# Patient Record
Sex: MALE | NOT HISPANIC OR LATINO | URBAN - METROPOLITAN AREA
[De-identification: names, ages, dates, MRNs, and addresses within clinical notes are randomized per-mention and may not be internally consistent; named-entity substitution may affect disease eponyms.]

---

## 2021-11-08 ENCOUNTER — IMPORTED ENCOUNTER (OUTPATIENT)
Dept: URBAN - METROPOLITAN AREA CLINIC 31 | Facility: CLINIC | Age: 76
End: 2021-11-08

## 2021-11-08 PROBLEM — E11.9: Noted: 2021-11-08

## 2021-11-08 PROBLEM — E11.3291: Noted: 2021-11-08

## 2021-11-08 PROBLEM — Z96.1: Noted: 2021-11-08

## 2021-11-08 PROBLEM — H25.12: Noted: 2021-11-08

## 2021-11-08 PROBLEM — H26.491: Noted: 2021-11-08

## 2021-11-08 PROCEDURE — 92004 COMPRE OPH EXAM NEW PT 1/>: CPT

## 2021-11-08 PROCEDURE — 92015 DETERMINE REFRACTIVE STATE: CPT

## 2021-11-08 NOTE — PATIENT DISCUSSION
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 Stressed the importance of regular followup and good control of BS BP and Lipids to avoid future complications.

## 2021-11-08 NOTE — PATIENT DISCUSSION
1.  Nuclear Sclerotic Cataract OS: Eval with Dr. Serrano [place] in [facility] on a Monday. Told will need glasses after Sx.2.  DM II without sign of Diabetic Retinopathy OS:  Discussed the pathophysiology of diabetes and its effect on the eye. Stressed the importance of regular followup and good control of BS BP and Lipids to avoid future complications. 3.  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 Stressed the importance of regular followup and good control of BS BP and Lipids to avoid future complications. 4. PCO  OD (Posterior Capsule Opacification)  Not visually significant at this time. Monitor for yag capsulotomy necessity. 5. Pseudophakia OD - IOL stable. Monitor for changes in vision.

## 2021-12-06 ENCOUNTER — IMPORTED ENCOUNTER (OUTPATIENT)
Dept: URBAN - METROPOLITAN AREA CLINIC 31 | Facility: CLINIC | Age: 76
End: 2021-12-06

## 2021-12-06 PROBLEM — Z96.1: Noted: 2021-12-06

## 2021-12-06 PROBLEM — H25.812: Noted: 2021-12-06

## 2021-12-06 PROCEDURE — 92025 CPTRIZED CORNEAL TOPOGRAPHY: CPT

## 2021-12-06 PROCEDURE — 99213 OFFICE O/P EST LOW 20 MIN: CPT

## 2021-12-06 PROCEDURE — 92136 OPHTHALMIC BIOMETRY: CPT

## 2021-12-06 NOTE — PATIENT DISCUSSION
1. Combined Types of Cataract OS: Discussed the risks benefits alternatives and limitations of cataract surgery including infection bleeding loss of vision retinal tears detachment. Refractive options were reviewed. The patient stated a full understanding and a desire to proceed with the procedure in the left eye. Patient has elected to be optimized for distance vision in the left eye. The patient will still need glasses for reading and to possibly fine tune distance vision. Karlos and [place] done today. distance correction. discussed need for glassesDiscussed via translation  by his daughter. Sasha Reagan NgoStressed elective nonurgent nature of the surgerya possibility of vision loss however remote discussed via daughterquestions answered2. Pseudophakia OD - IOL stable. Monitor for changes in vision.

## 2022-04-02 ASSESSMENT — VISUAL ACUITY
OU_CC: 20/25
OD_CC: 20/25
OD_GLARE: 20/40MED
OD_SC: J7
OD_PH: SC 20/40
OU_SC: J7
OD_CC: 20/70-1
OS_CC: 20/60
OS_GLARE: 20/50MED
OS_SC: J7
OS_CC: 20/40
OS_PH: SC 20/40

## 2022-04-02 ASSESSMENT — TONOMETRY
OS_IOP_MMHG: 14
OD_IOP_MMHG: 15
OD_IOP_MMHG: 13
OS_IOP_MMHG: 14